# Patient Record
Sex: FEMALE | Race: WHITE | URBAN - METROPOLITAN AREA
[De-identification: names, ages, dates, MRNs, and addresses within clinical notes are randomized per-mention and may not be internally consistent; named-entity substitution may affect disease eponyms.]

---

## 2018-03-06 ENCOUNTER — TRANSFERRED RECORDS (OUTPATIENT)
Dept: HEALTH INFORMATION MANAGEMENT | Facility: CLINIC | Age: 23
End: 2018-03-06

## 2018-03-08 ENCOUNTER — RADIANT APPOINTMENT (OUTPATIENT)
Dept: ULTRASOUND IMAGING | Facility: CLINIC | Age: 23
End: 2018-03-08
Attending: FAMILY MEDICINE
Payer: COMMERCIAL

## 2018-03-08 DIAGNOSIS — R35.0 URINE FREQUENCY: ICD-10-CM

## 2018-03-21 ENCOUNTER — MEDICAL CORRESPONDENCE (OUTPATIENT)
Dept: HEALTH INFORMATION MANAGEMENT | Facility: CLINIC | Age: 23
End: 2018-03-21

## 2018-03-24 ENCOUNTER — HEALTH MAINTENANCE LETTER (OUTPATIENT)
Age: 23
End: 2018-03-24

## 2018-03-29 ENCOUNTER — PRE VISIT (OUTPATIENT)
Dept: UROLOGY | Facility: CLINIC | Age: 23
End: 2018-03-29

## 2018-03-29 NOTE — TELEPHONE ENCOUNTER
RECORDS RECEIVED FROM: IN PROCESS FROM [facility]   DATE RECEIVED: IN PROCESS   NOTES STATUS DETAILS   OFFICE NOTE from referring provider {Records Status:775600    OFFICE NOTES from other specialists {Records Status:[id number]    DISCHARGE SUMMARY from hospital {Records Status:905529    DISCHARGE REPORT from the ER {Records Status:975110    OPERATIVE REPORT {Records Status:[id number]    MEDICATION LIST {Records Status:385704    LABS     URINALYSIS (UA) {Records Status:673182    URINE CYTOLOGY {Records Status:050630    DIAGNOSIS SPECIFIC LABS     OTHER: FREQUENCY OF URINATION {Records Status:832988 IN PROCESS

## 2018-04-16 ENCOUNTER — PRE VISIT (OUTPATIENT)
Dept: UROLOGY | Facility: CLINIC | Age: 23
End: 2018-04-16

## 2018-04-16 NOTE — TELEPHONE ENCOUNTER
Reason for visit: Urinary frequency consult     Relevant information: NA    Records/imaging/labs: referral from Luis Tello called: No need for a call    Rooming: dip/pvr

## 2018-04-19 ENCOUNTER — OFFICE VISIT (OUTPATIENT)
Dept: UROLOGY | Facility: CLINIC | Age: 23
End: 2018-04-19
Payer: COMMERCIAL

## 2018-04-19 VITALS
SYSTOLIC BLOOD PRESSURE: 114 MMHG | HEIGHT: 66 IN | HEART RATE: 66 BPM | BODY MASS INDEX: 23.13 KG/M2 | WEIGHT: 143.9 LBS | DIASTOLIC BLOOD PRESSURE: 78 MMHG

## 2018-04-19 DIAGNOSIS — N32.81 URGENCY-FREQUENCY SYNDROME: Primary | ICD-10-CM

## 2018-04-19 DIAGNOSIS — N39.41 URGE INCONTINENCE: ICD-10-CM

## 2018-04-19 DIAGNOSIS — M62.89 PELVIC FLOOR DYSFUNCTION: ICD-10-CM

## 2018-04-19 DIAGNOSIS — N94.10 DYSPAREUNIA IN FEMALE: ICD-10-CM

## 2018-04-19 DIAGNOSIS — M79.18 MYALGIA OF PELVIC FLOOR: ICD-10-CM

## 2018-04-19 LAB
APPEARANCE UR: CLEAR
BILIRUB UR QL: NORMAL
COLOR UR: YELLOW
GLUCOSE URINE: NORMAL MG/DL
HGB UR QL: NORMAL
KETONES UR QL: NORMAL MG/DL
LEUKOCYTE ESTERASE URINE: NORMAL
NITRITE UR QL STRIP: NORMAL
PH UR STRIP: 7 PH (ref 5–7)
PROTEIN ALBUMIN URINE: NORMAL MG/DL
SOURCE: NORMAL
SP GR UR STRIP: 1.01 (ref 1–1.03)
UROBILINOGEN UR QL STRIP: 0.2 EU/DL (ref 0.2–1)

## 2018-04-19 ASSESSMENT — ENCOUNTER SYMPTOMS
STIFFNESS: 0
SPEECH CHANGE: 0
INSOMNIA: 0
TREMORS: 0
SMELL DISTURBANCE: 0
DIZZINESS: 0
NIGHT SWEATS: 0
DYSPNEA ON EXERTION: 0
TROUBLE SWALLOWING: 0
NUMBNESS: 0
RECTAL PAIN: 0
EXERCISE INTOLERANCE: 0
HEMOPTYSIS: 0
BRUISES/BLEEDS EASILY: 0
BLOOD IN STOOL: 0
TASTE DISTURBANCE: 0
COUGH: 0
HOT FLASHES: 0
MYALGIAS: 0
EYE WATERING: 0
COUGH DISTURBING SLEEP: 0
SHORTNESS OF BREATH: 0
POLYDIPSIA: 0
EYE IRRITATION: 0
EXTREMITY NUMBNESS: 0
DEPRESSION: 0
PARALYSIS: 0
SINUS CONGESTION: 0
BOWEL INCONTINENCE: 0
DECREASED CONCENTRATION: 0
WEIGHT LOSS: 0
NECK PAIN: 0
SEIZURES: 0
MUSCLE CRAMPS: 0
SPUTUM PRODUCTION: 0
INCREASED ENERGY: 0
LIGHT-HEADEDNESS: 0
HOARSE VOICE: 0
SWOLLEN GLANDS: 0
RESPIRATORY PAIN: 0
PALPITATIONS: 0
VOMITING: 0
DISTURBANCES IN COORDINATION: 0
WHEEZING: 0
BLOATING: 0
FATIGUE: 0
WEIGHT GAIN: 0
HEARTBURN: 0
SNORES LOUDLY: 0
RECTAL BLEEDING: 0
SINUS PAIN: 0
CHILLS: 0
ARTHRALGIAS: 0
HALLUCINATIONS: 0
POSTURAL DYSPNEA: 0
TINGLING: 0
JAUNDICE: 0
ORTHOPNEA: 0
BREAST PAIN: 0
LOSS OF CONSCIOUSNESS: 0
LEG PAIN: 0
MEMORY LOSS: 0
HYPOTENSION: 0
EYE REDNESS: 0
SYNCOPE: 0
DIARRHEA: 0
DECREASED APPETITE: 0
CONSTIPATION: 0
DOUBLE VISION: 0
BREAST MASS: 0
BACK PAIN: 0
TACHYCARDIA: 0
ALTERED TEMPERATURE REGULATION: 0
WEAKNESS: 0
LEG SWELLING: 0
HYPERTENSION: 0
POLYPHAGIA: 0
NAUSEA: 0
CLAUDICATION: 0
NERVOUS/ANXIOUS: 0
NECK MASS: 0
DECREASED LIBIDO: 0
SORE THROAT: 0
JOINT SWELLING: 0
ABDOMINAL PAIN: 0
PANIC: 0
MUSCLE WEAKNESS: 0
HEADACHES: 0
SLEEP DISTURBANCES DUE TO BREATHING: 0
EYE PAIN: 0
FEVER: 0

## 2018-04-19 ASSESSMENT — PAIN SCALES - GENERAL: PAINLEVEL: NO PAIN (0)

## 2018-04-19 NOTE — MR AVS SNAPSHOT
After Visit Summary   4/19/2018    Viki Abdul    MRN: [id number]           Patient Information     Date Of Birth          1995        Visit Information        Provider Department      4/19/2018 3:00 PM Moira Ortega MD [facility] Urology and [facility] for Prostate and Urologic Cancers        Today's Diagnoses     Urgency-frequency syndrome    -  1    Dyspareunia in female        Myalgia of pelvic floor        Pelvic floor dysfunction        Urge incontinence          Care Instructions    Websites with free information:    American Urogynecologic Society patient website: www.voicesforpfd.org    Total Control Program: www.totalcontrolprogram.com    Please see one of the dedicated pelvic floor physical therapist ([facility] [phone])    Please return to see me in 4 months, sooner if needed    It was a pleasure meeting with you today.  Thank you for allowing me and my team the privilege of caring for you today.  YOU are the reason we are here, and I truly hope we provided you with the excellent service you deserve.  Please let us know if there is anything else we can do for you so that we can be sure you are leaving completely satisfied with your care experience.              Follow-ups after your visit        Additional Services     AALIYAH Physical Therapy Referral       **This order will print in the [facility] Scheduling Office**    Physical Therapy, Hand Therapy and Chiropractic Care are available through:    *[facility]  *[facility]  *[facility]    Call one number to schedule at any of the above locations: [phone].    Your provider has referred you to: Physical Therapy at [facility] or [facility]    Indication/Reason for Referral: Women's Health (Please Complete Special Programs SmartList)  Onset of Illness: years  Therapy Orders: Evaluate and Treat  Special Programs: None and Women's Health: Pelvic Dysfunction:  dyspareunia, myofascial tenderness of the pelvic floor, pelvic floor dysfunction  Pelvic Floor Weakness: urgency frequency, urge incontinence and  Biofeedback, E-Stim/TENS/TENS Rental if deemed appropriate by therapist, Exercise/HEP and Myofascial Release/Massage (internal)  Special Request: Exercise: Home Exercise Program  Manual Therapy: Myofascial Release/Massage (internal)  Modalities: As Indicated E-Stim/TENS    Keon Herbert      Additional Comments for the Therapist or Chiropractor: No ervin please.  Core strengthening    Please be aware that coverage of these services is subject to the terms and limitations of your health insurance plan.  Call member services at your health plan with any benefit or coverage questions.      Please bring the following to your appointment:    *Your personal calendar for scheduling future appointments  *Comfortable clothing                  Your next 10 appointments already scheduled     Aug 23, 2018 12:00 PM CDT   (Arrive by 11:45 AM)   Return Visit with Moira Ortega MD   [facility] Urology and [facility] for Prostate and Urologic Cancers ([facility])    [address] [place] [id number] [phone]              Who to contact     Please call your clinic at [phone] to:    Ask questions about your health    Make or cancel appointments    Discuss your medicines    Learn about your test results    Speak to your doctor            Additional Information About Your Visit        [employer] Information     [employer] gives you secure access to your electronic health record. If you see a primary care provider, you can also send messages to your care team and make appointments. If you have questions, please call your primary care clinic.  If you do not have a primary care provider, please call [phone] and they will assist you.      [employer] is an electronic gateway that provides easy, online access to your medical records. With  "MyChart, you can request a clinic appointment, read your test results, renew a prescription or communicate with your care team.     To access your existing account, please contact your [facility] or call [phone] for assistance.        Care EveryWhere ID     This is your Care EveryWhere ID. This could be used by other organizations to access your [facility] medical records  [id number]        Your Vitals Were     Pulse Height Last Period BMI (Body Mass Index)          66 1.676 m (5' 6\") 04/19/2017 23.23 kg/m2         Blood Pressure from Last 3 Encounters:   04/19/18 114/78    Weight from Last 3 Encounters:   04/19/18 65.3 kg (143 lb 14.4 oz)              We Performed the Following     AALIYAH Physical Therapy Referral     POST-VOID RESIDUAL BLADDER SCAN     Urinalysis chemical screen POCT        Primary Care Provider Fax #    Physician No Ref-Primary [phone]       No address on file        Equal Access to Services     LYNETTE SALAZAR : Hadii aad ku hadasho Sojaninaali, waaxda luqadaha, qaybta kaalmada adeegyada, waxay delroyin ericka royal . So [facility] [phone].    ATENCIÓN: Si habla español, tiene a lópez disposición servicios gratuitos de asistencia lingüística. Loriame al [phone].    We comply with applicable federal civil rights laws and [place] laws. We do not discriminate on the basis of race, color, national origin, age, disability, sex, sexual orientation, or gender identity.            Thank you!     Thank you for choosing [facility] UROLOGY AND [facility] FOR PROSTATE AND UROLOGIC CANCERS  for your care. Our goal is always to provide you with excellent care. Hearing back from our patients is one way we can continue to improve our services. Please take a few minutes to complete the written survey that you may receive in the mail after your visit with us. Thank you!             Your Updated Medication List - Protect others around you: Learn how to safely use, store and " throw away your medicines at www.disposemymeds.org.          This list is accurate as of 4/19/18  4:38 PM.  Always use your most recent med list.                   Brand Name Dispense Instructions for use Diagnosis    NEXPLANON 68 MG Impl   Generic drug:  etonogestrel      1 each by Subdermal route once

## 2018-04-19 NOTE — LETTER
4/19/2018       RE: Viki Abdul  [address]  [place] [address]     Dear Colleague,    Thank you for referring your patient, Viki Abdul, to the [facility] UROLOGY AND INST FOR PROSTATE AND UROLOGIC CANCERS at [facility]. Please see a copy of my visit note below.    April 19, 2018    Referring Provider: Joan Post MD  [address] [address]    Primary Care Provider: No Ref-Primary, Physician    CC: Urinary frequency    HPI:  Viki Abdul is a 23 year old female who presents for evaluation of her pelvic floor symptoms.  She has urinary frequency Q1hr as long as she can remember, even as a child.  She states that the urgency started the past couple years as well as the urgency incontinence.    Drinks about 60-70 oz of water.  A cup of coffee in the AM.    Denies gross hematuria, UTI, stress incontinence, constipation, abnormal vaginal bleeding, vaginal bulge.  Not currently sexually active.  Does reports history of uncomfortable intercourse.      Denies history of abuse.  Reports she feels safe at home.    [employer] major, about to graduate and looking for a job    Never has seen anyone for her symptoms before.     No past medical history on file.    No past surgical history on file.    Social History     Social History     Marital status: Single     Spouse name: N/A     Number of children: N/A     Years of education: N/A     Occupational History     Not on file.     Social History Main Topics     Smoking status: Never Smoker     Smokeless tobacco: Never Used     Alcohol use Not on file     Drug use: Not on file     Sexual activity: Not on file     Other Topics Concern     Not on file     Social History Narrative     No narrative on file     No family history on file.    Review of Systems     Constitutional:  Negative for fever, chills, weight loss, weight gain, fatigue, decreased appetite, night  sweats, recent stressors, height gain, height loss, post-operative complications, incisional pain, hallucinations, increased energy, hyperactivity and confused.   HENT:  Negative for ear pain, hearing loss, tinnitus, nosebleeds, trouble swallowing, hoarse voice, mouth sores, sore throat, ear discharge, tooth pain, gum tenderness, taste disturbance, smell disturbance, hearing aid, bleeding gums, dry mouth, sinus pain, sinus congestion and neck mass.    Eyes:  Negative for double vision, pain, redness, eye pain, decreased vision, eye watering, eye bulging, eye dryness, flashing lights, spots, floaters, strabismus, tunnel vision, jaundice and eye irritation.   Respiratory:   Negative for cough, hemoptysis, sputum production, shortness of breath, wheezing, sleep disturbances due to breathing, snores loudly, respiratory pain, dyspnea on exertion, cough disturbing sleep and postural dyspnea.    Cardiovascular:  Negative for chest pain, dyspnea on exertion, palpitations, orthopnea, claudication, leg swelling, fingers/toes turn blue, hypertension, hypotension, syncope, history of heart murmur, chest pain on exertion, chest pain at rest, pacemaker, few scattered varicosities, leg pain, sleep disturbances due to breathing, tachycardia, light-headedness, exercise intolerance and edema.   Gastrointestinal:  Negative for heartburn, nausea, vomiting, abdominal pain, diarrhea, constipation, blood in stool, melena, rectal pain, bloating, hemorrhoids, bowel incontinence, jaundice, rectal bleeding, coffee ground emesis and change in stool.   Genitourinary:  Positive for bladder incontinence and nocturia. Negative for vaginal discharge, genital sores, dyspareunia, decreased libido, arousal difficulty, abnormal vaginal bleeding, excessive menstruation, menstrual changes, hot flashes, vaginal dryness and postmenopausal bleeding.   Musculoskeletal:  Negative for myalgias, back pain, joint swelling, arthralgias, stiffness, muscle cramps,  "neck pain, bone pain, muscle weakness and fracture.   Skin:  Positive for rash.   Neurological:  Negative for dizziness, tingling, tremors, speech change, seizures, loss of consciousness, weakness, light-headedness, numbness, headaches, disturbances in coordination, extremity numbness, memory loss, difficulty walking and paralysis.   Endo/Heme:  Negative for anemia, swollen glands and bruises/bleeds easily.   Psychiatric/Behavioral:  Negative for depression, hallucinations, memory loss, decreased concentration, mood swings and panic attacks.    Breast:  Negative for breast discharge, breast mass, breast pain and nipple retraction.   Endocrine:  Negative for altered temperature regulation, polyphagia and polydipsia.    No Known Allergies    Current Outpatient Prescriptions   Medication     etonogestrel (NEXPLANON) 68 MG IMPL     No current facility-administered medications for this visit.      /78  Pulse 66  Ht 1.676 m (5' 6\")  Wt 65.3 kg (143 lb 14.4 oz)  LMP 04/19/2017  BMI 23.23 kg/m2 Patient's last menstrual period was 04/19/2017. Body mass index is 23.23 kg/(m^2).  She is alert and oriented.  She is well groomed, comfortable in no acute distress. Normal mood and affect.   Non-labored breathing. Normocephalic without masses, lesions, obvious abnormalities. Abdomen is soft, non-tender, non-distended, no CVAT.  Normal external female genitalia.  Negative ESST.  Speculum and bimanual exam are remarkable for myofascial tenderness of the pelvic floor.  She has excellent support on supine strain.  Skin dry, warm to touch. No lower extremity edema.  Full ROM in extremities with normal gait.      Urine dip negative    PVR 0 mL by bladder scan    A/P: Viki Abdul is a 23 year old F with urinary urgency frequency, urge incontinence, dyspareunia, myofascial tenderness of the pelvic floor, pelvic floor dysfunction    We discussed how her pelvic floor symptoms are related to the physical exam findings and her " pelvic floor myofascial dysfunction.  We discussed how the recommended treatment is dedicated pelvic floor therapy.  We discussed how the pelvic floor physical therapy works and patient is agreeable.  Referral was placed.      RTC 4 months, sooner if needed    35 minutes were spent with the patient today, > 50% in counseling and coordination of care    CC  Patient Care Team:  No Ref-Primary, Physician as PCP - General  Moira Ortega MD as MD (Urology)  Tracy Loera RN as Registered Nurse (Urology)  Francesca So as PCP (Marriage & Family Therapist)  GIGI MARX      Again, thank you for allowing me to participate in the care of your patient.      Sincerely,    Moira Ortega MD

## 2018-04-19 NOTE — PATIENT INSTRUCTIONS
Websites with free information:    American Urogynecologic Society patient website: www.voicesforpfd.org    Total Control Program: www.totalcontrolprogram.com    Please see one of the dedicated pelvic floor physical therapist ([facility] [phone])    Please return to see me in 4 months, sooner if needed    It was a pleasure meeting with you today.  Thank you for allowing me and my team the privilege of caring for you today.  YOU are the reason we are here, and I truly hope we provided you with the excellent service you deserve.  Please let us know if there is anything else we can do for you so that we can be sure you are leaving completely satisfied with your care experience.

## 2018-04-19 NOTE — NURSING NOTE
"Chief Complaint   Patient presents with     Consult     Urinary frequency and retention       Blood pressure 114/78, pulse 66, height 1.676 m (5' 6\"), weight 65.3 kg (143 lb 14.4 oz), last menstrual period 04/19/2017. Body mass index is 23.23 kg/(m^2).    There is no problem list on file for this patient.      No Known Allergies    Current Outpatient Prescriptions   Medication Sig Dispense Refill     etonogestrel (NEXPLANON) 68 MG IMPL 1 each by Subdermal route once         Social History   Substance Use Topics     Smoking status: Never Smoker     Smokeless tobacco: Never Used     Alcohol use Not on file       NICKO De La Rosa  4/19/2018  3:52 PM       "

## 2018-04-19 NOTE — PROGRESS NOTES
April 19, 2018    Referring Provider: Joan Post MD  [address] [address]    Primary Care Provider: No Ref-Primary, Physician    CC: Urinary frequency    HPI:  Viki Abdul is a 23 year old female who presents for evaluation of her pelvic floor symptoms.  She has urinary frequency Q1hr as long as she can remember, even as a child.  She states that the urgency started the past couple years as well as the urgency incontinence.    Drinks about 60-70 oz of water.  A cup of coffee in the AM.    Denies gross hematuria, UTI, stress incontinence, constipation, abnormal vaginal bleeding, vaginal bulge.  Not currently sexually active.  Does reports history of uncomfortable intercourse.      Denies history of abuse.  Reports she feels safe at home.    [employer] major, about to graduate and looking for a job    Never has seen anyone for her symptoms before.     No past medical history on file.    No past surgical history on file.    Social History     Social History     Marital status: Single     Spouse name: N/A     Number of children: N/A     Years of education: N/A     Occupational History     Not on file.     Social History Main Topics     Smoking status: Never Smoker     Smokeless tobacco: Never Used     Alcohol use Not on file     Drug use: Not on file     Sexual activity: Not on file     Other Topics Concern     Not on file     Social History Narrative     No narrative on file     No family history on file.    Review of Systems     Constitutional:  Negative for fever, chills, weight loss, weight gain, fatigue, decreased appetite, night sweats, recent stressors, height gain, height loss, post-operative complications, incisional pain, hallucinations, increased energy, hyperactivity and confused.   HENT:  Negative for ear pain, hearing loss, tinnitus, nosebleeds, trouble swallowing, hoarse voice, mouth sores, sore throat, ear discharge, tooth pain, gum  tenderness, taste disturbance, smell disturbance, hearing aid, bleeding gums, dry mouth, sinus pain, sinus congestion and neck mass.    Eyes:  Negative for double vision, pain, redness, eye pain, decreased vision, eye watering, eye bulging, eye dryness, flashing lights, spots, floaters, strabismus, tunnel vision, jaundice and eye irritation.   Respiratory:   Negative for cough, hemoptysis, sputum production, shortness of breath, wheezing, sleep disturbances due to breathing, snores loudly, respiratory pain, dyspnea on exertion, cough disturbing sleep and postural dyspnea.    Cardiovascular:  Negative for chest pain, dyspnea on exertion, palpitations, orthopnea, claudication, leg swelling, fingers/toes turn blue, hypertension, hypotension, syncope, history of heart murmur, chest pain on exertion, chest pain at rest, pacemaker, few scattered varicosities, leg pain, sleep disturbances due to breathing, tachycardia, light-headedness, exercise intolerance and edema.   Gastrointestinal:  Negative for heartburn, nausea, vomiting, abdominal pain, diarrhea, constipation, blood in stool, melena, rectal pain, bloating, hemorrhoids, bowel incontinence, jaundice, rectal bleeding, coffee ground emesis and change in stool.   Genitourinary:  Positive for bladder incontinence and nocturia. Negative for vaginal discharge, genital sores, dyspareunia, decreased libido, arousal difficulty, abnormal vaginal bleeding, excessive menstruation, menstrual changes, hot flashes, vaginal dryness and postmenopausal bleeding.   Musculoskeletal:  Negative for myalgias, back pain, joint swelling, arthralgias, stiffness, muscle cramps, neck pain, bone pain, muscle weakness and fracture.   Skin:  Positive for rash.   Neurological:  Negative for dizziness, tingling, tremors, speech change, seizures, loss of consciousness, weakness, light-headedness, numbness, headaches, disturbances in coordination, extremity numbness, memory loss, difficulty walking  "and paralysis.   Endo/Heme:  Negative for anemia, swollen glands and bruises/bleeds easily.   Psychiatric/Behavioral:  Negative for depression, hallucinations, memory loss, decreased concentration, mood swings and panic attacks.    Breast:  Negative for breast discharge, breast mass, breast pain and nipple retraction.   Endocrine:  Negative for altered temperature regulation, polyphagia and polydipsia.    No Known Allergies    Current Outpatient Prescriptions   Medication     etonogestrel (NEXPLANON) 68 MG IMPL     No current facility-administered medications for this visit.      /78  Pulse 66  Ht 1.676 m (5' 6\")  Wt 65.3 kg (143 lb 14.4 oz)  LMP 04/19/2017  BMI 23.23 kg/m2 Patient's last menstrual period was 04/19/2017. Body mass index is 23.23 kg/(m^2).  She is alert and oriented.  She is well groomed, comfortable in no acute distress. Normal mood and affect.   Non-labored breathing. Normocephalic without masses, lesions, obvious abnormalities. Abdomen is soft, non-tender, non-distended, no CVAT.  Normal external female genitalia.  Negative ESST.  Speculum and bimanual exam are remarkable for myofascial tenderness of the pelvic floor.  She has excellent support on supine strain.  Skin dry, warm to touch. No lower extremity edema.  Full ROM in extremities with normal gait.      Urine dip negative    PVR 0 mL by bladder scan    A/P: Viki Abdul is a 23 year old F with urinary urgency frequency, urge incontinence, dyspareunia, myofascial tenderness of the pelvic floor, pelvic floor dysfunction    We discussed how her pelvic floor symptoms are related to the physical exam findings and her pelvic floor myofascial dysfunction.  We discussed how the recommended treatment is dedicated pelvic floor therapy.  We discussed how the pelvic floor physical therapy works and patient is agreeable.  Referral was placed.      RTC 4 months, sooner if needed    35 minutes were spent with the patient today, > 50% in " counseling and coordination of care    Moira Ortega MD MPH    Urology    CC  Patient Care Team:  No Ref-Primary, Physician as PCP - General  Moira Ortega MD as MD (Urology)  Tracy Loera, RN as Registered Nurse (Urology)  Francesca So as PCP (Marriage & Family Therapist)  GIGI MARX

## 2018-04-24 PROBLEM — N39.41 URGE INCONTINENCE: Status: ACTIVE | Noted: 2018-04-24

## 2018-04-24 PROBLEM — M62.89 PELVIC FLOOR DYSFUNCTION: Status: ACTIVE | Noted: 2018-04-24

## 2018-04-24 PROBLEM — M79.18 MYALGIA OF PELVIC FLOOR: Status: ACTIVE | Noted: 2018-04-24

## 2018-04-24 PROBLEM — N32.81 URGENCY-FREQUENCY SYNDROME: Status: ACTIVE | Noted: 2018-04-24

## 2018-04-24 PROBLEM — N94.10 DYSPAREUNIA IN FEMALE: Status: ACTIVE | Noted: 2018-04-24

## 2018-05-03 ENCOUNTER — THERAPY VISIT (OUTPATIENT)
Dept: PHYSICAL THERAPY | Facility: CLINIC | Age: 23
End: 2018-05-03
Payer: COMMERCIAL

## 2018-05-03 DIAGNOSIS — N32.81 URGENCY-FREQUENCY SYNDROME: ICD-10-CM

## 2018-05-03 DIAGNOSIS — M99.05 PELVIC SOMATIC DYSFUNCTION: Primary | ICD-10-CM

## 2018-05-03 PROCEDURE — 97140 MANUAL THERAPY 1/> REGIONS: CPT | Mod: GP | Performed by: PHYSICAL THERAPIST

## 2018-05-03 PROCEDURE — 97530 THERAPEUTIC ACTIVITIES: CPT | Mod: GP | Performed by: PHYSICAL THERAPIST

## 2018-05-03 PROCEDURE — 97161 PT EVAL LOW COMPLEX 20 MIN: CPT | Mod: GP | Performed by: PHYSICAL THERAPIST

## 2018-05-03 PROCEDURE — 97110 THERAPEUTIC EXERCISES: CPT | Mod: GP | Performed by: PHYSICAL THERAPIST

## 2018-05-03 NOTE — PROGRESS NOTES
[facility] Initial Evaluation  Subjective:  HPI  SUBJECTIVE:  Patient reports onset of symptoms of urgency and urge incontinence ongoing for years but worsened in the last 6 months for unknown reasons.  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  Has recalled pain in past with intercourse.  Pt referred to PT with MD orders dated 4/19/2018. Symptoms include urgency/urge incontinence.  Since onset symptoms have been getting better, worse or staying the same? Worse to same    Urination:  Do you leak on the way to the bathroom or with a strong urge to void? Yes    Do you leak with cough,sneeze, jumping, running?No   Any other activities that cause leaking? none  Do you have triggers that make you feel you can't wait to go to the bathroom? No .  Type of pad and number used per day? no  When you leak what is the amount? medium    How long can you delay the need to urinate? 10 - 20 min, reports that she voids every 1-2 hours.   How many times do you get up to urinate at night? 1-2  Can you stop the flow of urine when on the toilet? unsure  Is the volume of urine passed usually: medium. (8sec rule=  250ml with average bladder storing  400-600ml)    Do you strain to pass urine? No  Do you have a slow or hesitant urinary stream? No  Do you have difficulty initiating the urine stream? No    How many bladder infections have you had in last 12 months?none    Fluid intake(one glass is 8oz or one cup) 8 glasses/day,  1 caffinated glasses/day  Not answered alcohol glasses/day.    Bowel habits:  Frequency of bowel movements?1-2 times a day  Consistancy of stool? soft formed,   Do you ignore the urge to defecate? No  Do you strain to pass stool? No    Pelvic Pain:  When do you have pelvic pain? None  Is initial penetration during intercourse painful? Yes  Is deeper penetration painful? Yes  Do you use lubricant? yes       Given birth? No   Are you  sexually active?not currently  Have you ever been worried for your physical safety? No  Any abdominal or pelvic surgeries? no  Are you having any regular exercise?yes  Have you practiced the PF(kegel) exercises for 4 or more weeks?no    Marinoff Scale:Level 1  (Level 3: Abstinence from intercourse because of severe pain. Level 2: Painful intercourse which limites frequency of activity. Level 1: Painful intercourse not severe enough to prevent activity.)                                    Objective:  System                                 Pelvic Dysfunction Evaluation:      Diagnostic Tests:        Post Void Residual:  Bladder US scan normal                  Flexibility:    Tightness present at:Adductors; Piriformis and Gluteals    Abdominal Wall:  Abdominal wall pelvic: decreased fascial mobility in abdominal bladder, small intestine, mesentary and pubovesical ligament.        Pelvic Clock Exam:          Levator ANI:  ++        External Assessment:  External assessment pelvic: perineal body is pulled up and in.  Skin Condition:  Normal    Bearing Down/Coughing:  Normal    Introitus:  Normal  Muscle Contraction/Perineal Mobility:  Slight lift, no urogential triangle descent  Internal Assessment:      Contraction/Grade:  Weak squeeze, 2 second hold (2)          SEMG Biofeedback:    Equipment:  [id number]    FlipKey electrode placement--Perianal:  Surface  Baseline EMG PM:  1.1 uV    Peak pelvic muscle contraction:  3-4 uV      Position:  Supine                     General     ROS    Assessment/Plan:    Patient is a 23 year old female with pelvic complaints.    Patient has the following significant findings with corresponding treatment plan.                Diagnosis 1:  Urgency/urge incontinence  Pain -  manual therapy, self management, education and home program  Decreased ROM/flexibility - manual therapy, therapeutic exercise, therapeutic activity and home program  Decreased strength - therapeutic exercise, therapeutic  activities and home program  Impaired muscle performance - biofeedback, neuro re-education and home program  Decreased function - therapeutic activities and home program    Therapy Evaluation Codes:   1) History comprised of:   Personal factors that impact the plan of care:      None.    Comorbidity factors that impact the plan of care are:      None.     Medications impacting care: None.  2) Examination of Body Systems comprised of:   Body structures and functions that impact the plan of care:      Pelvis.   Activity limitations that impact the plan of care are:      Urgency and Urge incontinence.  3) Clinical presentation characteristics are:   Stable/Uncomplicated.  4) Decision-Making    Low complexity using standardized patient assessment instrument and/or measureable assessment of functional outcome.  Cumulative Therapy Evaluation is: Low complexity.    Previous and current functional limitations:  (See Goal Flow Sheet for this information)    Short term and Long term goals: (See Goal Flow Sheet for this information)     Communication ability:  Patient appears to be able to clearly communicate and understand verbal and written communication and follow directions correctly.  Treatment Explanation - The following has been discussed with the patient:   RX ordered/plan of care  Anticipated outcomes  Possible risks and side effects  This patient would benefit from PT intervention to resume normal activities.   Rehab potential is good.    Frequency:  1 X week, once daily  Duration:  for 6 weeks  Discharge Plan:  Achieve all LTG.  Independent in home treatment program.  Reach maximal therapeutic benefit.    Please refer to the daily flowsheet for treatment today, total treatment time and time spent performing 1:1 timed codes.

## 2018-05-10 ENCOUNTER — THERAPY VISIT (OUTPATIENT)
Dept: PHYSICAL THERAPY | Facility: CLINIC | Age: 23
End: 2018-05-10
Payer: COMMERCIAL

## 2018-05-10 DIAGNOSIS — M99.05 PELVIC SOMATIC DYSFUNCTION: ICD-10-CM

## 2018-05-10 DIAGNOSIS — N32.81 URGENCY-FREQUENCY SYNDROME: ICD-10-CM

## 2018-05-10 PROCEDURE — 97112 NEUROMUSCULAR REEDUCATION: CPT | Mod: GP | Performed by: PHYSICAL THERAPIST

## 2018-05-10 PROCEDURE — 97530 THERAPEUTIC ACTIVITIES: CPT | Mod: GP | Performed by: PHYSICAL THERAPIST

## 2018-05-10 PROCEDURE — 97140 MANUAL THERAPY 1/> REGIONS: CPT | Mod: GP | Performed by: PHYSICAL THERAPIST

## 2018-05-18 ENCOUNTER — THERAPY VISIT (OUTPATIENT)
Dept: PHYSICAL THERAPY | Facility: CLINIC | Age: 23
End: 2018-05-18
Payer: COMMERCIAL

## 2018-05-18 DIAGNOSIS — N32.81 URGENCY-FREQUENCY SYNDROME: ICD-10-CM

## 2018-05-18 DIAGNOSIS — M99.05 PELVIC SOMATIC DYSFUNCTION: ICD-10-CM

## 2018-05-18 PROCEDURE — 97112 NEUROMUSCULAR REEDUCATION: CPT | Mod: GP | Performed by: PHYSICAL THERAPIST

## 2018-05-18 PROCEDURE — 97530 THERAPEUTIC ACTIVITIES: CPT | Mod: GP | Performed by: PHYSICAL THERAPIST

## 2018-05-18 PROCEDURE — 97140 MANUAL THERAPY 1/> REGIONS: CPT | Mod: GP | Performed by: PHYSICAL THERAPIST

## 2018-09-20 ENCOUNTER — THERAPY VISIT (OUTPATIENT)
Dept: PHYSICAL THERAPY | Facility: CLINIC | Age: 23
End: 2018-09-20
Payer: COMMERCIAL

## 2018-09-20 DIAGNOSIS — M99.05 PELVIC SOMATIC DYSFUNCTION: ICD-10-CM

## 2018-09-20 DIAGNOSIS — N32.81 URGENCY-FREQUENCY SYNDROME: ICD-10-CM

## 2018-09-20 PROCEDURE — 97140 MANUAL THERAPY 1/> REGIONS: CPT | Mod: GP | Performed by: PHYSICAL THERAPIST

## 2018-09-20 PROCEDURE — 97530 THERAPEUTIC ACTIVITIES: CPT | Mod: GP | Performed by: PHYSICAL THERAPIST

## 2018-09-21 NOTE — PROGRESS NOTES
Subjective:  HPI                    Objective:  System    Physical Exam    General     ROS    Assessment/Plan:    PROGRESS  REPORT    Progress reporting period is from 5/3/2018 to 9/22/2018, was seen total of 4 visits     SUBJECTIVE  Subjective changes noted by patient:  .  Subjective: Pt returns after last visit on 5/18/2018.  Pt reports that she has improved with less urinary frequency.  Voids every 2 hours, sometimes can hold 4 hours, but sometimes voids 30 min after going to the bathroom.  She drinks about 50-65 oz of fluid per day. Average voids 9 times per day, wakes up 1 time per night to void.  Main complaint still is slight urge incontinence that can occur 2-3 times per week, difficulty falling asleep until going to the bathroom 2 times at night.  This is worse after she works out around 7:30 pm, gets home and drinks  alot of water.  Pt is currently not sexually active.  Does recall pain in past when she has had intercourse    Current pain level is   .     Previous pain level was   Initial Pain level: 0/10.   Changes in function:  Yes (See Goal flowsheet attached for changes in current functional level)  Adverse reaction to treatment or activity: None    OBJECTIVE  Changes noted in objective findings:    Objective: PFM strength 2/5, tender B levator ani, presents with tight introitus.      ASSESSMENT/PLAN  Updated problem list and treatment plan: Diagnosis 1:  Pelvic floor dysfunction, urinary urgency/frequency  Pain -  manual therapy, self management, education and home program  Decreased ROM/flexibility - manual therapy, therapeutic exercise, therapeutic activity and home program  Decreased strength - therapeutic exercise, therapeutic activities and home program  Impaired muscle performance - biofeedback and neuro re-education  Decreased function - therapeutic activities and home program  STG/LTGs have been met or progress has been made towards goals:  Yes (See Goal flow sheet completed  today.)  Assessment of Progress: Patient is meeting short term goals and is progressing towards long term goals.  Self Management Plans:  Patient has been instructed in a home treatment program.  Patient is independent in a home treatment program.  Patient  has been instructed in self management of symptoms.  Patient is independent in self management of symptoms.  I have re-evaluated this patient and find that the nature, scope, duration and intensity of the therapy is appropriate for the medical condition of the patient.  Viki continues to require the following intervention to meet STG and LTG's:  PT    Recommendations:  This patient would benefit from continued therapy.   Pt will try self management with dilator for stretching and limiting fluids with workouts.  Return in 1 month prn  Frequency:  1 X a month, once daily  Duration:  for 2 visits        Please refer to the daily flowsheet for treatment today, total treatment time and time spent performing 1:1 timed codes.

## 2019-07-19 PROBLEM — M99.05 PELVIC SOMATIC DYSFUNCTION: Status: RESOLVED | Noted: 2018-05-03 | Resolved: 2019-07-19

## 2019-07-19 PROBLEM — N32.81 URGENCY-FREQUENCY SYNDROME: Status: RESOLVED | Noted: 2018-04-24 | Resolved: 2019-07-19

## 2019-11-05 ENCOUNTER — HEALTH MAINTENANCE LETTER (OUTPATIENT)
Age: 24
End: 2019-11-05

## 2020-11-22 ENCOUNTER — HEALTH MAINTENANCE LETTER (OUTPATIENT)
Age: 25
End: 2020-11-22

## 2021-09-19 ENCOUNTER — HEALTH MAINTENANCE LETTER (OUTPATIENT)
Age: 26
End: 2021-09-19

## 2022-01-09 ENCOUNTER — HEALTH MAINTENANCE LETTER (OUTPATIENT)
Age: 27
End: 2022-01-09

## 2022-11-20 ENCOUNTER — HEALTH MAINTENANCE LETTER (OUTPATIENT)
Age: 27
End: 2022-11-20

## 2023-04-15 ENCOUNTER — HEALTH MAINTENANCE LETTER (OUTPATIENT)
Age: 28
End: 2023-04-15